# Patient Record
Sex: FEMALE | Race: WHITE | NOT HISPANIC OR LATINO | URBAN - METROPOLITAN AREA
[De-identification: names, ages, dates, MRNs, and addresses within clinical notes are randomized per-mention and may not be internally consistent; named-entity substitution may affect disease eponyms.]

---

## 2023-06-30 ENCOUNTER — CLINICAL SUPPORT (OUTPATIENT)
Dept: REHABILITATION | Facility: HOSPITAL | Age: 53
End: 2023-06-30
Payer: COMMERCIAL

## 2023-06-30 DIAGNOSIS — R27.8 COORDINATION ABNORMAL: Primary | ICD-10-CM

## 2023-06-30 DIAGNOSIS — R53.1 WEAKNESS: ICD-10-CM

## 2023-06-30 PROCEDURE — 97530 THERAPEUTIC ACTIVITIES: CPT | Mod: PN | Performed by: PHYSICAL THERAPIST

## 2023-06-30 PROCEDURE — 97162 PT EVAL MOD COMPLEX 30 MIN: CPT | Mod: PN | Performed by: PHYSICAL THERAPIST

## 2023-06-30 NOTE — PLAN OF CARE
"[facility]   Physical Therapy Initial Evaluation     Date: 6/30/2023   Name: Sherry Neumann  Clinic Number: [id number]    Therapy Diagnosis:   Encounter Diagnoses   Name Primary?    Coordination abnormal Yes    Weakness      Physician: Praful Vazquez MD    Physician Orders: PT Eval and Treat PF PT  Medical Diagnosis from Referral: R32 (ICD-10-CM) - Urinary incontinence   Evaluation Date: 6/30/2023  Authorization Period Expiration: 7/30/23  Plan of Care Expiration: 9/22/2023  Progress Note Due: 7/28/2023  Visit # 1/12 Visits authorized: 1/ 1   FOTO: 1/3    Precautions: Standard     Time In: 11:00 AM   Time Out: 11:50 AM   Total Appointment Time (timed & untimed codes): 50 minutes      HISTORY      Sherry is a 53 y.o. female evaluated on 06/30/2023    Physician:  Praful Vazquez MD   Diagnosis:   Encounter Diagnoses   Name Primary?    Coordination abnormal Yes    Weakness       Treatment ordered: Physical Therapy  Medical History: Unremarkable  Surgical History:   Gallbladder surgery, knee surgery  Medications:   On Progesterone, but does not take it as she should.       Allergies: none    Precautions: universal    OB/GYN History:  She has 7 children. Vaginal deliveries. Episiotomy and stiches for her first. Her youngest is 14, oldest is 32. She did breastfeed all of her children.     Bladder/Bowel History: trouble initiating urine stream, childhood bladder problems - frequent UTI, dribbling after urination, trouble emptying bladder completely, recurrent bladder infections, and urinary incontinence      SUBJECTIVE     Date of onset: 2003  History of current complaint: Still having regular periods. Having heavier bleeding and as she is getting older her symptoms are more pronounced. She "feels" more. Will also have symptoms a few days before her period. She started the progesterone about 5 years ago with a cream. Patient states that about 20 years ago she started having problems with jumping on " a trampoline. It progressed to leaking when she sneezes. Wears a panty liner. Does not happen all of the time. Does like to play backyard volley ball. She is a teacher. Feels like she she would have to change her clothes. States that it will occur 4xs one day and other days not at all.     Patient's goals for therapy: improve bladder control   Pain: Patient reports 0/10, with 0 being the lowest and 10 being the highest.    Activities that cause symptoms: full bladder, with cough/sneeze/laugh/yell, and vigorous activity or exercise    Previous treatment included none    Sexually active? Yes - denies pain with intercourse    Frequency of urination:   Daytime: 8xs            Nighttime: 1-2 (coffee at 9 o'clock)    Difficulty initiating urine stream: No  Urine stream:  varies   Complete emptying: Yes, will return to the commode  Bladder leakage: Yes  Frequency of incidents: 1-4 times a day, sometimes none  Amount leaked (urine): teaspoons    Frequency of bowel movements: once a day, this has improved - vacations are hard, more urgent just before period  Difficulty initiating BM: Yes  Quality/Shape of BM: Casey Stool Chart 4  Colon leakage: No  Frequency of incidents: none   Amount leaked (bowels):  none  Form of protection: panty liner  Number of pads required in 24 hours: 1    Types of fluid intake: coffee (4 cups of coffee a day), water (constantly drinks - probably not enough)  Diet: Whole 30 diet - no dairy, no gluten, no added sugar, no grains  Current exercise: she does jog and does yoga, plays pickle ball, plays volleyball    Occupation: Pt works as a teacher and job-related duties include sitting, standing walking. She has more breaks in her day compared to when she had her own class.    OBJECTIVE     ORTHO SCREEN  Posture: WNL  Pelvic alignment:  not assessed     ABDOMINALS - not assessed     VAGINAL PELVIC FLOOR EXAM - to be performed      TREATMENT      Sherry participated in dynamic functional therapeutic  activities to improve functional performance for 15  minutes, including:  Physical therapist educated pt on core       Education: instructed on general anatomy/physiology of urinary/bowel system; discussed plan of care with patient and parent/guardian; instructed in benefits/risks of treatment; instructed in alternative methods of treatment; instructed in risks of refusing treatment; patient a parent agreed to treatment plan.     Also educated in: bladder irritants and anatomy/physiology of pelvic floor    ASSESSMENT      This is a 53 y.o. female referred to outpatient physical therapy and presents with a medical diagnosis of R32 (ICD-10-CM) - Urinary incontinence . Patient will benefit from skilled physical therapy to improve core and pelvic floor coordination and awareness, improve bowel and bladder habits.    Educational/Spiritual/Cultural needs: none  Abuse/Neglect: no signs  Nutritional Status: WDWN   Fall Risk: pt is not a fall risk    Pt's spiritual, cultural and educational needs considered and pt agreeable to plan of care and goals as stated below:     Medical necessity is demonstrated by the following IMPAIRMENTS/PROMBLEMS     History  Co-morbidities and personal factors that may impact the plan of care Examination  Body Structures and Functions, activity limitations and participation restrictions that may impact the plan of care Clinical Presentation   Decision Making/ Complexity Score   Co-morbidities:                 Personal Factors:    Body Regions/Systems/Functions:    poor knowledge of body mechanics and posture, decreased pelvic muscle strength, increased tension of the pelvic muscles, increased nocturia, poor fluid intake, poor knowledge of vulvar irritants, incomplete urination, dysfunctional voiding, and dysfunctional defecation           Activity limitations:   Barriers to Learning: none  Environmental Barriers: none noted    Participation Restrictions:           moderate     PLAN    Frequency:  1x per week  Duration: 12 weeks    Short Term Goals: 6 weeks   Patient will be independent with pelvic floor down training.  Patient will be independent with pelvic floor relaxation to improve bowel and bladder evacuation without straining.  Patient will be able to demonstrate improved pelvic floor relaxation and contraction on rehabilitative ultrasound vs. sEMG.  Patient will report regular meditation, diaphragmatic breathing, pelvic floor down training.   Patient will report being dry 5/7 days a week.   Patient will be independent with good water intake.     Long Term Goals: 12 weeks   Patient will report urinating every 3-4 hours with strong urine stream.   Patient will report improved quality of life and tolerance for activities outside of her home due to improved bladder habits.  Patient will demonstrate adequate pelvic floor coordination with contraction and relaxation on sEMG vs rehabilitative ultrasound.    Patient will be independent with proper core and pelvic floor coordination with progressive strength training.      Physical therapy will include: therapeutic exercise, manual therapy, therapeutic activities, neuromuscular re-education, manual therapy, modalities PRN, gait training, and self-care education.     Therapist: Gui Colorado, PT  6/30/2023

## 2023-07-27 ENCOUNTER — CLINICAL SUPPORT (OUTPATIENT)
Dept: REHABILITATION | Facility: HOSPITAL | Age: 53
End: 2023-07-27
Attending: OBSTETRICS & GYNECOLOGY
Payer: COMMERCIAL

## 2023-07-27 DIAGNOSIS — R53.1 WEAKNESS: ICD-10-CM

## 2023-07-27 DIAGNOSIS — R27.8 COORDINATION ABNORMAL: Primary | ICD-10-CM

## 2023-07-27 DIAGNOSIS — R32 URINARY INCONTINENCE, UNSPECIFIED TYPE: ICD-10-CM

## 2023-07-27 PROCEDURE — 97112 NEUROMUSCULAR REEDUCATION: CPT | Mod: PN | Performed by: PHYSICAL THERAPIST

## 2023-07-27 PROCEDURE — 97530 THERAPEUTIC ACTIVITIES: CPT | Mod: PN | Performed by: PHYSICAL THERAPIST

## 2023-07-27 PROCEDURE — 97140 MANUAL THERAPY 1/> REGIONS: CPT | Mod: PN | Performed by: PHYSICAL THERAPIST

## 2023-07-27 NOTE — PATIENT INSTRUCTIONS
DIAPHRAGMATIC BREATHING     The diaphragm is a dome shaped muscle that forms the floor of the rib cage. It is the most efficient muscle for breathing and relaxation, although most people are not used to using the diaphragm. Diaphragmatic or belly breathing is an important technique to learn because it helps settle down or relax the autonomic nervous system. The correct use of diaphragmatic breathing can help to quiet brain activity resulting in the relaxation of all the muscles and organs of the body. This is accomplished by slow rhythmic breathing concentrated in the diaphragm muscle rather than the chest.    How to do proper relaxation breathing:    Start by lying on your back or reclining in a chair in a relaxed position. Place one hand on your chest and the other on your abdomen.  Relax your jaw by placing your tongue on the floor of your mouth and keeping your teeth slightly apart.   Take a deep breath in, letting the abdomen expand and rise while you keep your upper chest, neck and shoulders relaxed.   As you breathe out, allow your abdomen and chest to fall. Exhale completely.  It doesn't matter if you breathe in/out through your nose and/or mouth. Do whichever feels comfortable.  Remember to breathe slowly.  Do not force your breathing. Do not hold your breath.  Repeat for 5 minutes every day.         Pelvic floor relaxation: gentle feeling of down an out   - occurs when urination, bowel movement, vaginal penetration, or passing gas  - every hour - tune into your pelvic floor and relax   - do not hold tension in the hips  - with breathing - let the diaphragm descend and the pelvic floor descend     A tight muscle does not work very well - we want coordination - we want to be able to contract the pelvic floor when we need it - not just all day for no reason     Pelvic floor contraction: Maryann - for now - do not practice this over and over use it if you need it!   Gentle feeling of up and in   Close your  box  Do not hold your breath   Inhale, exhale, gently bring your belly button to your back, then pelvic floor up and in   Hold for 10 seconds  NO one should know what you are doing

## 2023-07-27 NOTE — PROGRESS NOTES
Pelvic Health Physical Therapy   Treatment Note     Name: Sherry Neumann  Clinic Number: [id number]    Therapy Diagnosis:   Encounter Diagnoses   Name Primary?    Coordination abnormal Yes    Weakness     Urinary incontinence, unspecified type      Physician: Praful Vazquez MD    Visit Date: 7/27/2023    Physician Orders: PT Eval and Treat PF PT  Medical Diagnosis from Referral: R32 (ICD-10-CM) - Urinary incontinence   Evaluation Date: 6/30/2023  Authorization Period Expiration: 7/30/23  Plan of Care Expiration: 9/22/2023  Progress Note Due: 7/28/2023  Visit # 2/12 Visits authorized: 1/20  FOTO: 1/3    Cancelled Visits: 0  No Show Visits: 0    Time In: 1:12 PM   Time Out: 2:20 PM   Total Billable Time: 68 minutes    Precautions: Standard    Subjective     Pt reports: That she will leak urine with jumping/coughing/sneezing at times. This is worse just before her period. She admits to needing to take more time for Yoga.     She was compliant with home exercise program.  Response to previous treatment: none  Functional change: none    Pain in:  0/10  Pain out: 0/10  Location: none    Objective     Sherry received the following manual therapy techniques: for 10 minutes including: vaginal exam  VAGINAL PELVIC FLOOR EXAM    EXTERNAL ASSESSMENT  Introitus: WNL  Skin condition: WNL  Scarring: none   Sensation: WNL   Pain:  none  Voluntary contraction: nil  Voluntary relaxation: nil  Involuntary contraction: nil  Bearing down: nil  Perineal descent: absent      INTERNAL ASSESSMENT  Pain: none   Sensation: WNL  Vaginal vault: stenotic   Muscle Bulk: hypertonus   Muscle Power: trace/5  Muscle Endurance: DNT   # Reps To Fatigue: DNT    Fast Contractions: DNT     Quality of contraction: incomplete relaxation   Specificity: WNL   Coordination: tends to hold breath during PFM contration   Prolapse check:redundant tissue noted with increased intra-abdominal pressure at the anterior vaginal wall - mild  Comments: patient with limited  pelvic floor awareness    Sherry participated in neuromuscular re-education activities to develop Coordination, Down training, and Proprioception for 30 minutes including: rehabilitative ultrasound imaging to help visualize pelvic floor muscle contraction and relaxation with kegels  Educated on management of intra-abdominal pressure.     Sherry participated in dynamic functional therapeutic activities to improve functional performance for 23  minutes, including:  Encouraged diaphragmatic breathing, improved posture, regular stretching and cool down.           Intervention Eval  07/27/2023           Neuro Re-Ed PF down training   rehabilitative ultrasound imaging to help visualize pelvic floor muscle contraction and relaxation with kegels  Educated on management of intra-abdominal pressure.      Manual Ther Vaginal exam   hypertonicity    TherAct     Encouraged diaphragmatic breathing, improved posture, regular stretching and cool down.          Home Exercises Provided and Patient Education Provided     Education provided:   - anatomy/physiology of pelvic floor, diaphragmatic breathing, and proper bearing down techniques  Discussed progression of plan of care with patient; educated pt in activity modification; reviewed HEP with pt. Pt demonstrated and verbalized understanding of all instruction and was provided with a handout of HEP (see Patient Instructions).    Written Home Exercises Provided: yes.  Exercises were reviewed and Sherry was able to demonstrate them prior to the end of the session.  Sherry demonstrated good  understanding of the education provided.     See EMR under Patient Instructions for exercises provided 07/27/2023 .    Assessment     Patient with limited PF mobility and awareness. PF tension is limiting her ability to contract the pelvic floor when needed.     Sherry Is progressing well towards her goals.   Pt prognosis is Excellent.     Pt will continue to benefit from skilled outpatient physical therapy  to address the deficits listed in the problem list box on initial evaluation, provide pt/family education and to maximize pt's level of independence in the home and community environment.     Pt's spiritual, cultural and educational needs considered and pt agreeable to plan of care and goals.     Anticipated barriers to physical therapy: none    Short Term Goals: 6 weeks   Patient will be independent with pelvic floor down training.  Patient will be independent with pelvic floor relaxation to improve bowel and bladder evacuation without straining.  Patient will be able to demonstrate improved pelvic floor relaxation and contraction on rehabilitative ultrasound vs. sEMG.  Patient will report regular meditation, diaphragmatic breathing, pelvic floor down training.   Patient will report being dry 5/7 days a week.   Patient will be independent with good water intake.      Long Term Goals: 12 weeks   Patient will report urinating every 3-4 hours with strong urine stream.   Patient will report improved quality of life and tolerance for activities outside of her home due to improved bladder habits.  Patient will demonstrate adequate pelvic floor coordination with contraction and relaxation on sEMG vs rehabilitative ultrasound.    Patient will be independent with proper core and pelvic floor coordination with progressive strength training.      Plan     Continue with PF down training, proper core stability to manage intra-abdominal pressure, PF manual therapy.     Gui Colorado, PT

## 2023-08-08 ENCOUNTER — CLINICAL SUPPORT (OUTPATIENT)
Dept: REHABILITATION | Facility: HOSPITAL | Age: 53
End: 2023-08-08
Attending: OBSTETRICS & GYNECOLOGY
Payer: COMMERCIAL

## 2023-08-08 DIAGNOSIS — R27.8 COORDINATION ABNORMAL: Primary | ICD-10-CM

## 2023-08-08 DIAGNOSIS — R53.1 WEAKNESS: ICD-10-CM

## 2023-08-08 PROCEDURE — 97112 NEUROMUSCULAR REEDUCATION: CPT | Mod: PN | Performed by: PHYSICAL THERAPIST

## 2023-08-08 PROCEDURE — 97530 THERAPEUTIC ACTIVITIES: CPT | Mod: PN | Performed by: PHYSICAL THERAPIST

## 2023-08-08 NOTE — PROGRESS NOTES
Pelvic Health Physical Therapy   Treatment Note     Name: Sherry Neumann  Clinic Number: [id number]    Therapy Diagnosis:   Encounter Diagnoses   Name Primary?    Coordination abnormal Yes    Weakness      Physician: No ref. provider found    Visit Date: 8/8/2023    Physician Orders: PT Eval and Treat PF PT  Medical Diagnosis from Referral: R32 (ICD-10-CM) - Urinary incontinence   Evaluation Date: 6/30/2023  Authorization Period Expiration: 7/30/23  Plan of Care Expiration: 9/22/2023  Progress Note Due: 7/28/2023  Visit # 2/12 Visits authorized: 1/20  FOTO: 1/3    Cancelled Visits: 0  No Show Visits: 0    Time In: 3:21 PM   Time Out: 4:04 PM    Total Billable Time: 43 minutes    Precautions: Standard    Subjective     Pt reports: The last few times she sneezed she was able to hold it. She has done yoga recently.     She was compliant with home exercise program.  Response to previous treatment: none  Functional change: none    Pain in:  0/10  Pain out: 0/10  Location: none    Objective     Sherry participated in neuromuscular re-education activities to develop Coordination, Down training, and Proprioception for 15 minutes including: rehabilitative ultrasound imaging to help visualize pelvic floor muscle contraction and relaxation with kegels  Educated on management of intra-abdominal pressure. Rehabilitative ultrasound to the core with emphlysis on TA activation.     Sherry participated in dynamic functional therapeutic activities to improve functional performance for 28  minutes, including:  Encouraged core awareness and management of intra-abdominal pressure with dynamic tasks. Encouraged plyometrics with exercises to assist with core stability and awareness.          Intervention Eval  07/27/2023 08/08/2023           Neuro Re-Ed PF down training   rehabilitative ultrasound imaging to help visualize pelvic floor muscle contraction and relaxation with kegels  Educated on management of intra-abdominal pressure.    rehabilitative ultrasound imaging to help visualize pelvic floor muscle contraction and relaxation with kegels  Educated on management of intra-abdominal pressure. Rehabilitative ultrasound to the core with emphlysis on TA activation.    Manual Ther Vaginal exam   hypertonicity    TherAct     Encouraged diaphragmatic breathing, improved posture, regular stretching and cool down.   Encouraged core awareness and management of intra-abdominal pressure with dynamic tasks. Encouraged plyometrics with exercises to assist with core stability and awareness.          Home Exercises Provided and Patient Education Provided     Education provided:   - anatomy/physiology of pelvic floor, diaphragmatic breathing, and proper bearing down techniques  Discussed progression of plan of care with patient; educated pt in activity modification; reviewed HEP with pt. Pt demonstrated and verbalized understanding of all instruction and was provided with a handout of HEP (see Patient Instructions).    Written Home Exercises Provided: yes.  Exercises were reviewed and Sherry was able to demonstrate them prior to the end of the session.  Sherry demonstrated good  understanding of the education provided.     See EMR under Patient Instructions for exercises provided 07/27/2023 .    Assessment     Patient with improved core awareness today. Encouraged use of her hands to feel her TA contract and feel pressure.     Sherry Is progressing well towards her goals.   Pt prognosis is Excellent.     Pt will continue to benefit from skilled outpatient physical therapy to address the deficits listed in the problem list box on initial evaluation, provide pt/family education and to maximize pt's level of independence in the home and community environment.     Pt's spiritual, cultural and educational needs considered and pt agreeable to plan of care and goals.     Anticipated barriers to physical therapy: none    Short Term Goals: 6 weeks   Patient will be  independent with pelvic floor down training.  Patient will be independent with pelvic floor relaxation to improve bowel and bladder evacuation without straining.  Patient will be able to demonstrate improved pelvic floor relaxation and contraction on rehabilitative ultrasound vs. sEMG.  Patient will report regular meditation, diaphragmatic breathing, pelvic floor down training.   Patient will report being dry 5/7 days a week.   Patient will be independent with good water intake.      Long Term Goals: 12 weeks   Patient will report urinating every 3-4 hours with strong urine stream.   Patient will report improved quality of life and tolerance for activities outside of her home due to improved bladder habits.  Patient will demonstrate adequate pelvic floor coordination with contraction and relaxation on sEMG vs rehabilitative ultrasound.    Patient will be independent with proper core and pelvic floor coordination with progressive strength training.      Plan     Continue with PF down training, proper core stability to manage intra-abdominal pressure, PF manual therapy.     Gui [place], PT

## 2023-08-24 ENCOUNTER — CLINICAL SUPPORT (OUTPATIENT)
Dept: REHABILITATION | Facility: HOSPITAL | Age: 53
End: 2023-08-24
Attending: OBSTETRICS & GYNECOLOGY
Payer: COMMERCIAL

## 2023-08-24 DIAGNOSIS — R32 URINARY INCONTINENCE, UNSPECIFIED TYPE: ICD-10-CM

## 2023-08-24 DIAGNOSIS — R27.8 COORDINATION ABNORMAL: Primary | ICD-10-CM

## 2023-08-24 DIAGNOSIS — R53.1 WEAKNESS: ICD-10-CM

## 2023-08-24 PROCEDURE — 97112 NEUROMUSCULAR REEDUCATION: CPT | Mod: PN | Performed by: PHYSICAL THERAPIST

## 2023-08-24 NOTE — PATIENT INSTRUCTIONS
Pelvic floor relaxation: gentle feeling of down and out - let go of the tension  - butterfly, happy baby pose, maricruz pose  - occurs when urination, bowel movement, vaginal penetration, or passing gas  - every hour - tune into your pelvic floor and relax   - do not hold tension in the hips - crossing the legs   - with breathing - let the diaphragm descend and the pelvic floor descend      A tight muscle does not work very well - good cool down after a workout.       Pelvic floor contraction:   Gentle feeling of up and in   Close your box  Do not hold your breath   Inhale, exhale, gently bring your belly button to your back, then pelvic floor up and in   Hold for 10 seconds if can   NO one should know what you are doing    With cough, sneeze, laugh, lift - close your box quickly

## 2023-08-24 NOTE — PROGRESS NOTES
Pelvic Health Physical Therapy   Treatment Note     Name: Sherry Neumann  Clinic Number: [id number]    Therapy Diagnosis:   Encounter Diagnoses   Name Primary?    Coordination abnormal Yes    Weakness     Urinary incontinence, unspecified type      Physician: Praful Vazquez MD    Visit Date: 8/24/2023    Physician Orders: PT Eval and Treat PF PT  Medical Diagnosis from Referral: R32 (ICD-10-CM) - Urinary incontinence   Evaluation Date: 6/30/2023  Authorization Period Expiration: 7/30/23  Plan of Care Expiration: 9/22/2023  Progress Note Due: 7/28/2023  Visit # 4/12 Visits authorized: 3/20  FOTO: 1/3    Cancelled Visits: 0  No Show Visits: 0    Time In: 3:28 PM   Time Out: 4:01 PM    Total Billable Time: 33 minutes    Precautions: Standard    Subjective     Pt reports: Doing better with UI. Has not been wearing a pad. Doing Yoga 2xs a week.       She was compliant with home exercise program.  Response to previous treatment: none  Functional change: none    Pain in:  0/10  Pain out: 0/10  Location: none    Objective     Sherry participated in neuromuscular re-education activities to develop Coordination, Down training, and Proprioception for 33 minutes including: rehabilitative ultrasound imaging to help visualize pelvic floor muscle contraction and relaxation with kegels  Rehabilitative ultrasound with PF coordination to assist with UI. Limited pelvic floor activation today.          Intervention Eval  07/27/2023 08/08/2023 08/24/2023            Neuro Re-Ed PF down training   rehabilitative ultrasound imaging to help visualize pelvic floor muscle contraction and relaxation with kegels  Educated on management of intra-abdominal pressure.   rehabilitative ultrasound imaging to help visualize pelvic floor muscle contraction and relaxation with kegels  Educated on management of intra-abdominal pressure. Rehabilitative ultrasound to the core with emphlysis on TA activation.  363mL     PF coordination - limited PF activation  - partially due to very full bladder   PVR: 0mL   Manual Ther Vaginal exam   hypertonicity     TherAct     Encouraged diaphragmatic breathing, improved posture, regular stretching and cool down.   Encouraged core awareness and management of intra-abdominal pressure with dynamic tasks. Encouraged plyometrics with exercises to assist with core stability and awareness.           Home Exercises Provided and Patient Education Provided     Education provided:   - anatomy/physiology of pelvic floor, diaphragmatic breathing, and proper bearing down techniques  Discussed progression of plan of care with patient; educated pt in activity modification; reviewed HEP with pt. Pt demonstrated and verbalized understanding of all instruction and was provided with a handout of HEP (see Patient Instructions).    Written Home Exercises Provided: yes.  Exercises were reviewed and Sherry was able to demonstrate them prior to the end of the session.  Sherry demonstrated good  understanding of the education provided.     See EMR under Patient Instructions for exercises provided 07/27/2023 .    Assessment     Limited PF activation today; however, bladder was very full. She did empty well. Will attempt to come in next week without significant bladder fullness.     Sherry Is progressing well towards her goals.   Pt prognosis is Excellent.     Pt will continue to benefit from skilled outpatient physical therapy to address the deficits listed in the problem list box on initial evaluation, provide pt/family education and to maximize pt's level of independence in the home and community environment.     Pt's spiritual, cultural and educational needs considered and pt agreeable to plan of care and goals.     Anticipated barriers to physical therapy: none    Short Term Goals: 6 weeks   Patient will be independent with pelvic floor down training.  Patient will be independent with pelvic floor relaxation to improve bowel and bladder evacuation without  straining.  Patient will be able to demonstrate improved pelvic floor relaxation and contraction on rehabilitative ultrasound vs. sEMG.  Patient will report regular meditation, diaphragmatic breathing, pelvic floor down training.   Patient will report being dry 5/7 days a week.   Patient will be independent with good water intake.      Long Term Goals: 12 weeks   Patient will report urinating every 3-4 hours with strong urine stream.   Patient will report improved quality of life and tolerance for activities outside of her home due to improved bladder habits.  Patient will demonstrate adequate pelvic floor coordination with contraction and relaxation on sEMG vs rehabilitative ultrasound.    Patient will be independent with proper core and pelvic floor coordination with progressive strength training.      Plan     Continue with PF down training, proper core stability to manage intra-abdominal pressure, PF manual therapy.     Gui [place], PT

## 2023-08-31 ENCOUNTER — CLINICAL SUPPORT (OUTPATIENT)
Dept: REHABILITATION | Facility: HOSPITAL | Age: 53
End: 2023-08-31
Attending: OBSTETRICS & GYNECOLOGY
Payer: COMMERCIAL

## 2023-08-31 DIAGNOSIS — R32 URINARY INCONTINENCE, UNSPECIFIED TYPE: ICD-10-CM

## 2023-08-31 DIAGNOSIS — R27.8 COORDINATION ABNORMAL: Primary | ICD-10-CM

## 2023-08-31 DIAGNOSIS — R53.1 WEAKNESS: ICD-10-CM

## 2023-08-31 PROCEDURE — 97112 NEUROMUSCULAR REEDUCATION: CPT | Mod: PN | Performed by: PHYSICAL THERAPIST

## 2023-08-31 NOTE — PROGRESS NOTES
Pelvic Health Physical Therapy   Treatment Note and Progress note     Name: Sherry Neumann  Clinic Number: [id number]    Therapy Diagnosis:   Encounter Diagnoses   Name Primary?    Coordination abnormal Yes    Weakness     Urinary incontinence, unspecified type      Physician: Praful Vazquez MD    Visit Date: 8/31/2023    Physician Orders: PT Eval and Treat PF PT  Medical Diagnosis from Referral: R32 (ICD-10-CM) - Urinary incontinence   Evaluation Date: 6/30/2023  Authorization Period Expiration: 7/30/23  Plan of Care Expiration: 9/22/2023  Progress Note Due:  9/28/2023  Visit # 5/12 Visits authorized: 4/20  FOTO: 1/3    Cancelled Visits: 0  No Show Visits: 0    Time In: 3:28 PM   Time Out: 4:09 PM   Total Billable Time: 41 minutes    Precautions: Standard    Subjective     Pt reports: Has been very busy. Has been forgetting to perform Kegel pirior to sneezing. Has had a few bouts of UI. Doing Yoga.     She was compliant with home exercise program.  Response to previous treatment: none  Functional change: none    Pain in:  0/10  Pain out: 0/10  Location: none    Objective     Sherry participated in neuromuscular re-education activities to develop Coordination, Down training, and Proprioception for 41 minutes including: rehabilitative ultrasound imaging to help visualize pelvic floor muscle contraction and relaxation with kegels  Rehabilitative ultrasound with PF coordination to assist with UI. Limited pelvic floor activation today.Added lower extremity adduction with Kegels to better facilitate PF contraction. Discussed the benefits of hormones for vaginal tissue health.           Intervention Eval  07/27/2023 08/08/2023 08/24/2023 08/31/2023             Neuro Re-Ed PF down training   rehabilitative ultrasound imaging to help visualize pelvic floor muscle contraction and relaxation with kegels  Educated on management of intra-abdominal pressure.   rehabilitative ultrasound imaging to help visualize pelvic floor  muscle contraction and relaxation with kegels  Educated on management of intra-abdominal pressure. Rehabilitative ultrasound to the core with emphlysis on TA activation.  363mL     PF coordination - limited PF activation - partially due to very full bladder   PVR: 0mL Endurnance = 2 sec  Uncoordinated quick Kegels   Benefiting from overflow of bilateral adductors   Manual Ther Vaginal exam   hypertonicity      TherAct     Encouraged diaphragmatic breathing, improved posture, regular stretching and cool down.   Encouraged core awareness and management of intra-abdominal pressure with dynamic tasks. Encouraged plyometrics with exercises to assist with core stability and awareness.            Home Exercises Provided and Patient Education Provided     Education provided:   - anatomy/physiology of pelvic floor, diaphragmatic breathing, and proper bearing down techniques  Discussed progression of plan of care with patient; educated pt in activity modification; reviewed HEP with pt. Pt demonstrated and verbalized understanding of all instruction and was provided with a handout of HEP (see Patient Instructions).    Written Home Exercises Provided: yes.  Exercises were reviewed and Sherry was able to demonstrate them prior to the end of the session.  Sherry demonstrated good  understanding of the education provided.     See EMR under Patient Instructions for exercises provided 07/27/2023 .    Assessment     Improved PF activation with lower extremity adduction and in hooklying today.     Sherry Is progressing well towards her goals.   Pt prognosis is Excellent.     Pt will continue to benefit from skilled outpatient physical therapy to address the deficits listed in the problem list box on initial evaluation, provide pt/family education and to maximize pt's level of independence in the home and community environment.     Pt's spiritual, cultural and educational needs considered and pt agreeable to plan of care and goals.      Anticipated barriers to physical therapy: none    Short Term Goals: 6 weeks   Patient will be independent with pelvic floor down training. Goal met 08/31/2023   Patient will be independent with pelvic floor relaxation to improve bowel and bladder evacuation without straining. Goal met 08/31/2023   Patient will be able to demonstrate improved pelvic floor relaxation and contraction on rehabilitative ultrasound vs. sEMG.Goal met 08/31/2023   Patient will report regular meditation, diaphragmatic breathing, pelvic floor down training. Goal met 08/31/2023   Patient will report being dry 5/7 days a week. Goal not met - progressing   Patient will be independent with good water intake. Goal met 08/31/2023      Long Term Goals: 12 weeks   Patient will report urinating every 3-4 hours with strong urine stream. Goal not met progressing  Patient will report improved quality of life and tolerance for activities outside of her home due to improved bladder habits.Goal not met progressing  Patient will demonstrate adequate pelvic floor coordination with contraction and relaxation on sEMG vs rehabilitative ultrasound.  Goal not met progressing  Patient will be independent with proper core and pelvic floor coordination with progressive strength training.  Goal not met progressing    Plan     Continue with PF down training, proper core stability to manage intra-abdominal pressure, PF manual therapy.     Gui [place], PT

## 2023-09-19 ENCOUNTER — CLINICAL SUPPORT (OUTPATIENT)
Dept: REHABILITATION | Facility: HOSPITAL | Age: 53
End: 2023-09-19
Attending: OBSTETRICS & GYNECOLOGY
Payer: COMMERCIAL

## 2023-09-19 DIAGNOSIS — R53.1 WEAKNESS: ICD-10-CM

## 2023-09-19 DIAGNOSIS — R27.8 COORDINATION ABNORMAL: Primary | ICD-10-CM

## 2023-09-19 PROCEDURE — 97112 NEUROMUSCULAR REEDUCATION: CPT | Mod: PN | Performed by: PHYSICAL THERAPIST

## 2023-09-19 NOTE — PROGRESS NOTES
Pelvic Health Physical Therapy   Treatment Note and Updated plan of care      Name: Sherry Neumann  Clinic Number: [id number]    Therapy Diagnosis:   Encounter Diagnoses   Name Primary?    Coordination abnormal Yes    Weakness      Physician: Praful Vazquez MD    Visit Date: 9/19/2023    Physician Orders: PT Eval and Treat PF PT  Medical Diagnosis from Referral: R32 (ICD-10-CM) - Urinary incontinence   Evaluation Date: 6/30/2023  Authorization Period Expiration: 7/30/23  Plan of Care Expiration: 9/22/2023  Progress Note Due:  9/28/2023  Visit # 6/12 Visits authorized: 5/20  FOTO: 1/3    Cancelled Visits: 0  No Show Visits: 0    Time In: 3:27 PM   Time Out: 4:08 PM    Total Billable Time: 41 minutes    Precautions: Standard    Subjective     Pt reports: Needs to get back on track with her Kegels. Still forgetting to Kegel prior to sneezing. Doing more Kegels in the car. Takes Progesterone by mouth and she does not take. Having break through bleeding.     She was compliant with home exercise program.  Response to previous treatment: none  Functional change: none    Pain in:  0/10  Pain out: 0/10  Location: none    Objective     Sherry participated in neuromuscular re-education activities to develop Coordination, Down training, and Proprioception for 41 minutes including: rehabilitative ultrasound imaging to help visualize pelvic floor muscle contraction and relaxation with kegels  Rehabilitative ultrasound with PF coordination to assist with UI. Limited pelvic floor activation today.Added lower extremity adduction with Kegels to better facilitate PF contraction. Discussed the benefits of hormones for vaginal tissue health.  Encouraged adequate core stability to improve bladder control as well.          Intervention Eval  07/27/2023 08/08/2023 08/24/2023 08/31/2023 09/19/2023              Neuro Re-Ed PF down training   rehabilitative ultrasound imaging to help visualize pelvic floor muscle contraction and relaxation  with kegels  Educated on management of intra-abdominal pressure.   rehabilitative ultrasound imaging to help visualize pelvic floor muscle contraction and relaxation with kegels  Educated on management of intra-abdominal pressure. Rehabilitative ultrasound to the core with emphlysis on TA activation.  363mL     PF coordination - limited PF activation - partially due to very full bladder   PVR: 0mL Endurnance = 2 sec  Uncoordinated quick Kegels   Benefiting from overflow of bilateral adductors rehabilitative ultrasound imaging to help visualize pelvic floor muscle contraction and relaxation with kegels  Rehabilitative ultrasound with PF coordination to assist with UI. Limited pelvic floor activation today.Added lower extremity adduction with Kegels to better facilitate PF contraction. Discussed the benefits of hormones for vaginal tissue health.  Encouraged adequate core stability to improve bladder control as well.    Manual Ther Vaginal exam   hypertonicity       TherAct     Encouraged diaphragmatic breathing, improved posture, regular stretching and cool down.   Encouraged core awareness and management of intra-abdominal pressure with dynamic tasks. Encouraged plyometrics with exercises to assist with core stability and awareness.             Home Exercises Provided and Patient Education Provided     Education provided:   - anatomy/physiology of pelvic floor, diaphragmatic breathing, and proper bearing down techniques  Discussed progression of plan of care with patient; educated pt in activity modification; reviewed HEP with pt. Pt demonstrated and verbalized understanding of all instruction and was provided with a handout of HEP (see Patient Instructions).    Written Home Exercises Provided: yes.  Exercises were reviewed and Sherry was able to demonstrate them prior to the end of the session.  Sherry demonstrated good  understanding of the education provided.     See EMR under Patient Instructions for exercises  provided 07/27/2023 .    Assessment     Improved PF activation with lower extremity adduction and in hooklying today.     Sherry Is progressing well towards her goals.   Pt prognosis is Excellent.     Pt will continue to benefit from skilled outpatient physical therapy to address the deficits listed in the problem list box on initial evaluation, provide pt/family education and to maximize pt's level of independence in the home and community environment.     Pt's spiritual, cultural and educational needs considered and pt agreeable to plan of care and goals.     Anticipated barriers to physical therapy: none    Short Term Goals: 6 weeks   Patient will be independent with pelvic floor down training. Goal met 09/19/2023   Patient will be independent with pelvic floor relaxation to improve bowel and bladder evacuation without straining. Goal met 09/19/2023   Patient will be able to demonstrate improved pelvic floor relaxation and contraction on rehabilitative ultrasound vs. sEMG.Goal met 08/31/2023   Patient will report regular meditation, diaphragmatic breathing, pelvic floor down training. Goal met 08/31/2023   Patient will report being dry 5/7 days a week. Goal not met - progressing   Patient will be independent with good water intake. Goal met 08/31/2023      Long Term Goals: 12 weeks   Patient will report urinating every 3-4 hours with strong urine stream. Goal not met progressing  Patient will report improved quality of life and tolerance for activities outside of her home due to improved bladder habits.Goal not met progressing  Patient will demonstrate adequate pelvic floor coordination with contraction and relaxation on sEMG vs rehabilitative ultrasound.  Goal not met progressing  Patient will be independent with proper core and pelvic floor coordination with progressive strength training.  Goal not met progressing    Plan     Continue with PF down training, proper core stability to manage intra-abdominal  pressure, PF manual therapy.     Progress treatment with vaginal e-stim for pelvic floor strength training.     Will continue with POC 1x a week x 7 weeks.patient has been advised to discuss vaginal estrogen with her gyn. She may also benefit from a urethral bulking agent in the future.     Gui [place], PT

## 2023-09-29 ENCOUNTER — CLINICAL SUPPORT (OUTPATIENT)
Dept: REHABILITATION | Facility: HOSPITAL | Age: 53
End: 2023-09-29
Payer: COMMERCIAL

## 2023-09-29 DIAGNOSIS — R53.1 WEAKNESS: ICD-10-CM

## 2023-09-29 DIAGNOSIS — R27.8 COORDINATION ABNORMAL: Primary | ICD-10-CM

## 2023-09-29 DIAGNOSIS — R32 URINARY INCONTINENCE, UNSPECIFIED TYPE: ICD-10-CM

## 2023-09-29 PROCEDURE — 97112 NEUROMUSCULAR REEDUCATION: CPT | Mod: PN | Performed by: PHYSICAL THERAPIST

## 2023-09-29 NOTE — PROGRESS NOTES
Pelvic Health Physical Therapy   Treatment Note      Name: Sherry Neumann  Clinic Number: [id number]    Therapy Diagnosis:   Encounter Diagnoses   Name Primary?    Coordination abnormal Yes    Urinary incontinence, unspecified type     Weakness        Physician: Praful Vazquez MD    Visit Date: 9/29/2023    Physician Orders: PT Eval and Treat PF PT  Medical Diagnosis from Referral: R32 (ICD-10-CM) - Urinary incontinence   Evaluation Date: 6/30/2023  Authorization Period Expiration: 7/30/23  Plan of Care Expiration: 11/7/23  Progress Note Due:  10/19/23  Visit # 7/12 Visits authorized: 6/20  FOTO: 1/3    Cancelled Visits: 0  No Show Visits: 0    Time In: 7:32 AM   Time Out: 8:00 AM    Total Billable Time: 28 minutes    Precautions: Standard    Subjective     Pt reports: She sneezed yesterday and did not leak. She was able to do Yoga 3xs this week and she was able to run.     She was compliant with home exercise program.  Response to previous treatment: none  Functional change: none    Pain in:  0/10  Pain out: 0/10  Location: none    Objective     Sherry participated in neuromuscular re-education activities to develop Coordination, Down training, and Proprioception for 28 minutes including: rehabilitative ultrasound imaging to help visualize pelvic floor muscle contraction and relaxation with kegels  sEMG with stim - pt would fatigue and have to increase pelvic floor recruitment with Kegels  Resting 0uV - Kegel to 10uV with a 3-5 sec hold.   PF e-stim at 50Hz x 3 min to faciliate PF contraction         Intervention Eval  07/27/2023 08/08/2023 08/24/2023 08/31/2023 09/19/2023 09/29/2023               Neuro Re-Ed PF down training   rehabilitative ultrasound imaging to help visualize pelvic floor muscle contraction and relaxation with kegels  Educated on management of intra-abdominal pressure.   rehabilitative ultrasound imaging to help visualize pelvic floor muscle contraction and relaxation with kegels  Educated on  management of intra-abdominal pressure. Rehabilitative ultrasound to the core with emphlysis on TA activation.  363mL     PF coordination - limited PF activation - partially due to very full bladder   PVR: 0mL Endurnance = 2 sec  Uncoordinated quick Kegels   Benefiting from overflow of bilateral adductors rehabilitative ultrasound imaging to help visualize pelvic floor muscle contraction and relaxation with kegels  Rehabilitative ultrasound with PF coordination to assist with UI. Limited pelvic floor activation today.Added lower extremity adduction with Kegels to better facilitate PF contraction. Discussed the benefits of hormones for vaginal tissue health.  Encouraged adequate core stability to improve bladder control as well.  sEMG and PF e-stim   Manual Ther Vaginal exam   hypertonicity        TherAct     Encouraged diaphragmatic breathing, improved posture, regular stretching and cool down.   Encouraged core awareness and management of intra-abdominal pressure with dynamic tasks. Encouraged plyometrics with exercises to assist with core stability and awareness.              Home Exercises Provided and Patient Education Provided     Education provided:   - anatomy/physiology of pelvic floor, diaphragmatic breathing, and proper bearing down techniques  Discussed progression of plan of care with patient; educated pt in activity modification; reviewed HEP with pt. Pt demonstrated and verbalized understanding of all instruction and was provided with a handout of HEP (see Patient Instructions).    Written Home Exercises Provided: yes.  Exercises were reviewed and Sherry was able to demonstrate them prior to the end of the session.  Sherry demonstrated good  understanding of the education provided.     See EMR under Patient Instructions for exercises provided 07/27/2023 .    Assessment     PF fatigue. PF awareness has improved.     Sherry Is progressing well towards her goals.   Pt prognosis is Excellent.     Pt will  continue to benefit from skilled outpatient physical therapy to address the deficits listed in the problem list box on initial evaluation, provide pt/family education and to maximize pt's level of independence in the home and community environment.     Pt's spiritual, cultural and educational needs considered and pt agreeable to plan of care and goals.     Anticipated barriers to physical therapy: none    Short Term Goals: 6 weeks   Patient will be independent with pelvic floor down training. Goal met 09/19/2023   Patient will be independent with pelvic floor relaxation to improve bowel and bladder evacuation without straining. Goal met 09/19/2023   Patient will be able to demonstrate improved pelvic floor relaxation and contraction on rehabilitative ultrasound vs. sEMG.Goal met 08/31/2023   Patient will report regular meditation, diaphragmatic breathing, pelvic floor down training. Goal met 08/31/2023   Patient will report being dry 5/7 days a week. Goal not met - progressing   Patient will be independent with good water intake. Goal met 08/31/2023      Long Term Goals: 12 weeks   Patient will report urinating every 3-4 hours with strong urine stream. Goal not met progressing  Patient will report improved quality of life and tolerance for activities outside of her home due to improved bladder habits.Goal not met progressing  Patient will demonstrate adequate pelvic floor coordination with contraction and relaxation on sEMG vs rehabilitative ultrasound.  Goal not met progressing  Patient will be independent with proper core and pelvic floor coordination with progressive strength training.  Goal not met progressing    Plan     Continue with PF down training, proper core stability to manage intra-abdominal pressure, PF manual therapy.     Progress treatment with vaginal e-stim for pelvic floor strength training.     Will continue with POC 1x a week x 7 weeks.patient has been advised to discuss vaginal estrogen with  her gyn. She may also benefit from a urethral bulking agent in the future.     Gui [place], PT

## 2023-10-06 ENCOUNTER — CLINICAL SUPPORT (OUTPATIENT)
Dept: REHABILITATION | Facility: HOSPITAL | Age: 53
End: 2023-10-06
Payer: COMMERCIAL

## 2023-10-06 DIAGNOSIS — R53.1 WEAKNESS: ICD-10-CM

## 2023-10-06 DIAGNOSIS — R32 URINARY INCONTINENCE, UNSPECIFIED TYPE: ICD-10-CM

## 2023-10-06 DIAGNOSIS — R27.8 COORDINATION ABNORMAL: Primary | ICD-10-CM

## 2023-10-06 PROCEDURE — 97112 NEUROMUSCULAR REEDUCATION: CPT | Mod: PN | Performed by: PHYSICAL THERAPIST

## 2023-10-06 NOTE — PROGRESS NOTES
Pelvic Health Physical Therapy   Treatment Note      Name: Sherry Neumann  Clinic Number: [id number]    Therapy Diagnosis:   Encounter Diagnoses   Name Primary?    Coordination abnormal Yes    Urinary incontinence, unspecified type     Weakness      Physician: Praful Vazquez MD    Visit Date: 10/6/2023    Physician Orders: PT Eval and Treat PF PT  Medical Diagnosis from Referral: R32 (ICD-10-CM) - Urinary incontinence   Evaluation Date: 6/30/2023  Authorization Period Expiration: 7/30/23  Plan of Care Expiration: 11/7/23  Progress Note Due:  10/19/23  Visit # 8/12 Visits authorized: 7/20  FOTO: 1/3    Cancelled Visits: 0  No Show Visits: 0    Time In: 7:32 AM   Time Out: 8:00 AM    Total Billable Time: 28 minutes    Precautions: Standard    Subjective     Pt reports: No leaks this week. Still doing Yoga.     She was compliant with home exercise program.  Response to previous treatment: none  Functional change: none    Pain in:  0/10  Pain out: 0/10  Location: none    Objective     Sherry participated in neuromuscular re-education activities to develop Coordination, Down training, and Proprioception for 28 minutes including: rehabilitative ultrasound imaging to help visualize pelvic floor muscle contraction and relaxation with kegels  sEMG with stim - pt would fatigue and have to increase pelvic floor recruitment with Kegels  Resting 0uV - Kegel to 10uV with a 3-5 sec hold.   PF e-stim at 50Hz x 10 min to faciliate PF contraction         Intervention 08/31/2023  09/19/2023  09/29/2023  10/06/2023            Neuro Re-Ed PF down training Endurnance = 2 sec  Uncoordinated quick Kegels   Benefiting from overflow of bilateral adductors rehabilitative ultrasound imaging to help visualize pelvic floor muscle contraction and relaxation with kegels  Rehabilitative ultrasound with PF coordination to assist with UI. Limited pelvic floor activation today.Added lower extremity adduction with Kegels to better facilitate PF  contraction. Discussed the benefits of hormones for vaginal tissue health.  Encouraged adequate core stability to improve bladder control as well.  sEMG and PF e-stim PF e-stim 50Hz 5sec on 10 sec off x 10 min    Manual Ther Vaginal exam       TherAct            Home Exercises Provided and Patient Education Provided     Education provided:   - anatomy/physiology of pelvic floor, diaphragmatic breathing, and proper bearing down techniques  Discussed progression of plan of care with patient; educated pt in activity modification; reviewed HEP with pt. Pt demonstrated and verbalized understanding of all instruction and was provided with a handout of HEP (see Patient Instructions).    Written Home Exercises Provided: yes.  Exercises were reviewed and Sherry was able to demonstrate them prior to the end of the session.  Sherry demonstrated good  understanding of the education provided.     See EMR under Patient Instructions for exercises provided 07/27/2023 .    Assessment     PF fatigue. PF awareness has improved.     Sherry Is progressing well towards her goals.   Pt prognosis is Excellent.     Pt will continue to benefit from skilled outpatient physical therapy to address the deficits listed in the problem list box on initial evaluation, provide pt/family education and to maximize pt's level of independence in the home and community environment.     Pt's spiritual, cultural and educational needs considered and pt agreeable to plan of care and goals.     Anticipated barriers to physical therapy: none    Short Term Goals: 6 weeks   Patient will be independent with pelvic floor down training. Goal met 09/19/2023   Patient will be independent with pelvic floor relaxation to improve bowel and bladder evacuation without straining. Goal met 09/19/2023   Patient will be able to demonstrate improved pelvic floor relaxation and contraction on rehabilitative ultrasound vs. sEMG.Goal met 08/31/2023   Patient will report regular  meditation, diaphragmatic breathing, pelvic floor down training. Goal met 08/31/2023   Patient will report being dry 5/7 days a week. Goal not met - progressing   Patient will be independent with good water intake. Goal met 08/31/2023      Long Term Goals: 12 weeks   Patient will report urinating every 3-4 hours with strong urine stream. Goal not met progressing  Patient will report improved quality of life and tolerance for activities outside of her home due to improved bladder habits.Goal not met progressing  Patient will demonstrate adequate pelvic floor coordination with contraction and relaxation on sEMG vs rehabilitative ultrasound.  Goal not met progressing  Patient will be independent with proper core and pelvic floor coordination with progressive strength training.  Goal not met progressing    Plan     Continue with PF down training, proper core stability to manage intra-abdominal pressure, PF manual therapy.     Progress treatment with vaginal e-stim for pelvic floor strength training.     Will continue with POC 1x a week x 7 weeks.patient has been advised to discuss vaginal estrogen with her gyn. She may also benefit from a urethral bulking agent in the future.     Decrease frequency to EOW for 2 more visits then may place on hold.     Gui [place], PT

## 2023-10-20 ENCOUNTER — CLINICAL SUPPORT (OUTPATIENT)
Dept: REHABILITATION | Facility: HOSPITAL | Age: 53
End: 2023-10-20
Attending: OBSTETRICS & GYNECOLOGY
Payer: COMMERCIAL

## 2023-10-20 DIAGNOSIS — R53.1 WEAKNESS: ICD-10-CM

## 2023-10-20 DIAGNOSIS — R32 URINARY INCONTINENCE, UNSPECIFIED TYPE: ICD-10-CM

## 2023-10-20 DIAGNOSIS — R27.8 COORDINATION ABNORMAL: Primary | ICD-10-CM

## 2023-10-20 PROCEDURE — 97112 NEUROMUSCULAR REEDUCATION: CPT | Mod: PN | Performed by: PHYSICAL THERAPIST

## 2023-10-20 NOTE — PROGRESS NOTES
Pelvic Health Physical Therapy   Treatment Note      Name: Sherry Neumann  Clinic Number: [id number]    Therapy Diagnosis:   No diagnosis found.    Physician: Praful Vazquez MD    Visit Date: 10/20/2023    Physician Orders: PT Eval and Treat PF PT  Medical Diagnosis from Referral: R32 (ICD-10-CM) - Urinary incontinence   Evaluation Date: 6/30/2023  Authorization Period Expiration: 7/30/23  Plan of Care Expiration: 11/7/23  Progress Note Due:  10/19/23  Visit # 9/12 Visits authorized: 8/20  FOTO: 1/3    Cancelled Visits: 0  No Show Visits: 0    Time In: 7:39 AM   Time Out: 8:04 AM     Total Billable Time: 23 minutes    Precautions: Standard    Subjective     Pt reports: Had about 2 leaks in the past 2 weeks.     She was compliant with home exercise program.  Response to previous treatment: none  Functional change: none    Pain in:  0/10  Pain out: 0/10  Location: none    Objective     Sherry participated in neuromuscular re-education activities to develop Coordination, Down training, and Proprioception for 23 minutes including: rehabilitative ultrasound imaging to help visualize pelvic floor muscle contraction and relaxation with kegels  sEMG with stim - pt would fatigue and have to increase pelvic floor recruitment with Kegels  Resting 0uV - Kegel to 10uV with a 3-5 sec hold.   PF e-stim at 50Hz x 10 min to faciliate PF contraction         Intervention 08/31/2023  09/19/2023  09/29/2023  10/06/2023  10/20/2023             Neuro Re-Ed PF down training Endurnance = 2 sec  Uncoordinated quick Kegels   Benefiting from overflow of bilateral adductors rehabilitative ultrasound imaging to help visualize pelvic floor muscle contraction and relaxation with kegels  Rehabilitative ultrasound with PF coordination to assist with UI. Limited pelvic floor activation today.Added lower extremity adduction with Kegels to better facilitate PF contraction. Discussed the benefits of hormones for vaginal tissue health.  Encouraged adequate  core stability to improve bladder control as well.  sEMG and PF e-stim PF e-stim 50Hz 5sec on 10 sec off x 10 min  PF e-stim 50Hz 5sec on 10 sec off x 10 min    Manual Ther Vaginal exam        TherAct             Home Exercises Provided and Patient Education Provided     Education provided:   - anatomy/physiology of pelvic floor, diaphragmatic breathing, and proper bearing down techniques  Discussed progression of plan of care with patient; educated pt in activity modification; reviewed HEP with pt. Pt demonstrated and verbalized understanding of all instruction and was provided with a handout of HEP (see Patient Instructions).    Written Home Exercises Provided: yes.  Exercises were reviewed and Sherry was able to demonstrate them prior to the end of the session.  Sherry demonstrated good  understanding of the education provided.     See EMR under Patient Instructions for exercises provided 07/27/2023 .    Assessment     PF fatigue. PF awareness has improved.     Sherry Is progressing well towards her goals.   Pt prognosis is Excellent.     Pt will continue to benefit from skilled outpatient physical therapy to address the deficits listed in the problem list box on initial evaluation, provide pt/family education and to maximize pt's level of independence in the home and community environment.     Pt's spiritual, cultural and educational needs considered and pt agreeable to plan of care and goals.     Anticipated barriers to physical therapy: none    Short Term Goals: 6 weeks   Patient will be independent with pelvic floor down training. Goal met 09/19/2023   Patient will be independent with pelvic floor relaxation to improve bowel and bladder evacuation without straining. Goal met 09/19/2023   Patient will be able to demonstrate improved pelvic floor relaxation and contraction on rehabilitative ultrasound vs. sEMG.Goal met 08/31/2023   Patient will report regular meditation, diaphragmatic breathing, pelvic floor down  training. Goal met 08/31/2023   Patient will report being dry 5/7 days a week. Goal not met - progressing   Patient will be independent with good water intake. Goal met 08/31/2023      Long Term Goals: 12 weeks   Patient will report urinating every 3-4 hours with strong urine stream. Goal not met progressing  Patient will report improved quality of life and tolerance for activities outside of her home due to improved bladder habits.Goal not met progressing  Patient will demonstrate adequate pelvic floor coordination with contraction and relaxation on sEMG vs rehabilitative ultrasound.  Goal not met progressing  Patient will be independent with proper core and pelvic floor coordination with progressive strength training.  Goal not met progressing    Plan     Continue with PF down training, proper core stability to manage intra-abdominal pressure, PF manual therapy.     Progress treatment with vaginal e-stim for pelvic floor strength training.     Will continue with POC 1x a week x 7 weeks.patient has been advised to discuss vaginal estrogen with her gyn. She may also benefit from a urethral bulking agent in the future.     Decrease frequency to EOW for 2 more visits then may place on hold.     Gui [place], PT

## 2023-11-03 ENCOUNTER — CLINICAL SUPPORT (OUTPATIENT)
Dept: REHABILITATION | Facility: HOSPITAL | Age: 53
End: 2023-11-03
Attending: OBSTETRICS & GYNECOLOGY
Payer: COMMERCIAL

## 2023-11-03 DIAGNOSIS — R27.8 COORDINATION ABNORMAL: ICD-10-CM

## 2023-11-03 DIAGNOSIS — R32 URINARY INCONTINENCE, UNSPECIFIED TYPE: Primary | ICD-10-CM

## 2023-11-03 DIAGNOSIS — R53.1 WEAKNESS: ICD-10-CM

## 2023-11-03 PROCEDURE — 97112 NEUROMUSCULAR REEDUCATION: CPT | Mod: PN | Performed by: PHYSICAL THERAPIST

## 2023-11-03 NOTE — PLAN OF CARE
Pelvic Health Physical Therapy   Treatment Note and Updated POC     Name: Sherry Neumann  Clinic Number: [id number]    Therapy Diagnosis:   Encounter Diagnoses   Name Primary?    Urinary incontinence, unspecified type Yes    Coordination abnormal     Weakness        Physician: Praful Vazquez MD    Visit Date: 11/3/2023    Physician Orders: PT Eval and Treat PF PT  Medical Diagnosis from Referral: R32 (ICD-10-CM) - Urinary incontinence   Evaluation Date: 6/30/2023  Authorization Period Expiration: 7/30/23  Plan of Care Expiration: 1/26/2024  Progress Note Due:  12/1/2023  Visit # 10/12 Visits authorized: 9/20  FOTO: 2/3    Cancelled Visits: 0  No Show Visits: 0    Time In: 7:31 AM   Time Out: 8:00 AM   Total Billable Time: 29 minutes    Precautions: Standard    Subjective     Pt reports: Had a few small leaks this week. Had some pain in her left shoulder due to sleeping wrong. Doing well over all. Reports that nocturia is also improved. She is still running and doing Yoga.   She is pleased with her progress this far.     She was compliant with home exercise program.  Response to previous treatment: none  Functional change: none    Pain in:  0/10  Pain out: 0/10  Location: none    Objective       Sherry participated in neuromuscular re-education activities to develop Coordination, Down training, and Proprioception for 29 minutes including: rehabilitative ultrasound imaging to help visualize pelvic floor muscle contraction and relaxation with kegels         Intervention 08/31/2023  09/19/2023  09/29/2023  10/06/2023  10/20/2023  11/03/2023              Neuro Re-Ed PF down training Endurnance = 2 sec  Uncoordinated quick Kegels   Benefiting from overflow of bilateral adductors rehabilitative ultrasound imaging to help visualize pelvic floor muscle contraction and relaxation with kegels  Rehabilitative ultrasound with PF coordination to assist with UI. Limited pelvic floor activation today.Added lower extremity adduction  with Kegels to better facilitate PF contraction. Discussed the benefits of hormones for vaginal tissue health.  Encouraged adequate core stability to improve bladder control as well.  sEMG and PF e-stim PF e-stim 50Hz 5sec on 10 sec off x 10 min  PF e-stim 50Hz 5sec on 10 sec off x 10 min  rehabilitative ultrasound imaging to help visualize pelvic floor muscle contraction and relaxation with kegels   Manual Ther Vaginal exam         TherAct              Home Exercises Provided and Patient Education Provided     Education provided:   - anatomy/physiology of pelvic floor, diaphragmatic breathing, and proper bearing down techniques  Discussed progression of plan of care with patient; educated pt in activity modification; reviewed HEP with pt. Pt demonstrated and verbalized understanding of all instruction and was provided with a handout of HEP (see Patient Instructions).    Written Home Exercises Provided: yes.  Exercises were reviewed and Sherry was able to demonstrate them prior to the end of the session.  Sherry demonstrated good  understanding of the education provided.     See EMR under Patient Instructions for exercises provided 07/27/2023 .    Assessment     PF activation has improved.     Sherry Is progressing well towards her goals.   Pt prognosis is Excellent.     Pt will continue to benefit from skilled outpatient physical therapy to address the deficits listed in the problem list box on initial evaluation, provide pt/family education and to maximize pt's level of independence in the home and community environment.     Pt's spiritual, cultural and educational needs considered and pt agreeable to plan of care and goals.     Anticipated barriers to physical therapy: none    Short Term Goals: 6 weeks   Patient will be independent with pelvic floor down training. Goal met 09/19/2023   Patient will be independent with pelvic floor relaxation to improve bowel and bladder evacuation without straining. Goal met 09/19/2023    Patient will be able to demonstrate improved pelvic floor relaxation and contraction on rehabilitative ultrasound vs. sEMG.Goal met 08/31/2023   Patient will report regular meditation, diaphragmatic breathing, pelvic floor down training. Goal met 08/31/2023   Patient will report being dry 5/7 days a week. Goal met - dry 5-6/7 days - 11/03/2023   Patient will be independent with good water intake. Goal met 08/31/2023      Long Term Goals: 12 weeks   Patient will report urinating every 3-4 hours with strong urine stream. Goal not met progressing  Patient will report improved quality of life and tolerance for activities outside of her home due to improved bladder habits.Goal met - 11/03/2023   Patient will demonstrate adequate pelvic floor coordination with contraction and relaxation on sEMG vs rehabilitative ultrasound.  Goal not met progressing  Patient will be independent with proper core and pelvic floor coordination with progressive strength training.  Goal not met progressing    Plan     Continue with PF down training, proper core stability to manage intra-abdominal pressure, PF manual therapy.     Progress treatment with vaginal e-stim for pelvic floor strength training.     Patient has been advised to discuss vaginal estrogen with her gyn. She may also benefit from a urethral bulking agent in the future.     Decrease frequency to 1-2 xs per month x 3 more visit.    Gui [place], PT

## 2023-11-03 NOTE — PROGRESS NOTES
Pelvic Health Physical Therapy   Treatment Note and Updated POC     Name: Sherry Neumann  Clinic Number: [id number]    Therapy Diagnosis:   Encounter Diagnoses   Name Primary?    Urinary incontinence, unspecified type Yes    Coordination abnormal     Weakness        Physician: Praful Vazquez MD    Visit Date: 11/3/2023    Physician Orders: PT Eval and Treat PF PT  Medical Diagnosis from Referral: R32 (ICD-10-CM) - Urinary incontinence   Evaluation Date: 6/30/2023  Authorization Period Expiration: 7/30/23  Plan of Care Expiration: 1/26/2024  Progress Note Due:  12/1/2023  Visit # 10/12 Visits authorized: 9/20  FOTO: 2/3    Cancelled Visits: 0  No Show Visits: 0    Time In: 7:31 AM   Time Out: 8:00 AM   Total Billable Time: 29 minutes    Precautions: Standard    Subjective     Pt reports: Had a few small leaks this week. Had some pain in her left shoulder due to sleeping wrong. Doing well over all. Reports that nocturia is also improved. She is still running and doing Yoga.   She is pleased with her progress this far.     She was compliant with home exercise program.  Response to previous treatment: none  Functional change: none    Pain in:  0/10  Pain out: 0/10  Location: none    Objective       Sherry participated in neuromuscular re-education activities to develop Coordination, Down training, and Proprioception for 29 minutes including: rehabilitative ultrasound imaging to help visualize pelvic floor muscle contraction and relaxation with kegels         Intervention 08/31/2023  09/19/2023  09/29/2023  10/06/2023  10/20/2023  11/03/2023              Neuro Re-Ed PF down training Endurnance = 2 sec  Uncoordinated quick Kegels   Benefiting from overflow of bilateral adductors rehabilitative ultrasound imaging to help visualize pelvic floor muscle contraction and relaxation with kegels  Rehabilitative ultrasound with PF coordination to assist with UI. Limited pelvic floor activation today.Added lower extremity adduction  with Kegels to better facilitate PF contraction. Discussed the benefits of hormones for vaginal tissue health.  Encouraged adequate core stability to improve bladder control as well.  sEMG and PF e-stim PF e-stim 50Hz 5sec on 10 sec off x 10 min  PF e-stim 50Hz 5sec on 10 sec off x 10 min  rehabilitative ultrasound imaging to help visualize pelvic floor muscle contraction and relaxation with kegels   Manual Ther Vaginal exam         TherAct              Home Exercises Provided and Patient Education Provided     Education provided:   - anatomy/physiology of pelvic floor, diaphragmatic breathing, and proper bearing down techniques  Discussed progression of plan of care with patient; educated pt in activity modification; reviewed HEP with pt. Pt demonstrated and verbalized understanding of all instruction and was provided with a handout of HEP (see Patient Instructions).    Written Home Exercises Provided: yes.  Exercises were reviewed and Sherry was able to demonstrate them prior to the end of the session.  Sherry demonstrated good  understanding of the education provided.     See EMR under Patient Instructions for exercises provided 07/27/2023 .    Assessment     PF activation has improved.     Sherry Is progressing well towards her goals.   Pt prognosis is Excellent.     Pt will continue to benefit from skilled outpatient physical therapy to address the deficits listed in the problem list box on initial evaluation, provide pt/family education and to maximize pt's level of independence in the home and community environment.     Pt's spiritual, cultural and educational needs considered and pt agreeable to plan of care and goals.     Anticipated barriers to physical therapy: none    Short Term Goals: 6 weeks   Patient will be independent with pelvic floor down training. Goal met 09/19/2023   Patient will be independent with pelvic floor relaxation to improve bowel and bladder evacuation without straining. Goal met 09/19/2023    Patient will be able to demonstrate improved pelvic floor relaxation and contraction on rehabilitative ultrasound vs. sEMG.Goal met 08/31/2023   Patient will report regular meditation, diaphragmatic breathing, pelvic floor down training. Goal met 08/31/2023   Patient will report being dry 5/7 days a week. Goal met - dry 5-6/7 days - 11/03/2023   Patient will be independent with good water intake. Goal met 08/31/2023      Long Term Goals: 12 weeks   Patient will report urinating every 3-4 hours with strong urine stream. Goal not met progressing  Patient will report improved quality of life and tolerance for activities outside of her home due to improved bladder habits.Goal met - 11/03/2023   Patient will demonstrate adequate pelvic floor coordination with contraction and relaxation on sEMG vs rehabilitative ultrasound.  Goal not met progressing  Patient will be independent with proper core and pelvic floor coordination with progressive strength training.  Goal not met progressing    Plan     Continue with PF down training, proper core stability to manage intra-abdominal pressure, PF manual therapy.     Progress treatment with vaginal e-stim for pelvic floor strength training.     Patient has been advised to discuss vaginal estrogen with her gyn. She may also benefit from a urethral bulking agent in the future.     Decrease frequency to 1-2 xs per month x 3 more visit.    Gui [place], PT

## 2023-12-01 ENCOUNTER — CLINICAL SUPPORT (OUTPATIENT)
Dept: REHABILITATION | Facility: HOSPITAL | Age: 53
End: 2023-12-01
Attending: OBSTETRICS & GYNECOLOGY
Payer: COMMERCIAL

## 2023-12-01 DIAGNOSIS — R53.1 WEAKNESS: ICD-10-CM

## 2023-12-01 DIAGNOSIS — R32 URINARY INCONTINENCE, UNSPECIFIED TYPE: Primary | ICD-10-CM

## 2023-12-01 DIAGNOSIS — R27.8 COORDINATION ABNORMAL: ICD-10-CM

## 2023-12-01 PROCEDURE — 97112 NEUROMUSCULAR REEDUCATION: CPT | Mod: PN | Performed by: PHYSICAL THERAPIST

## 2023-12-01 NOTE — PROGRESS NOTES
Pelvic Health Physical Therapy   Treatment Note and Discharge Summary     Name: Sherry Neumann  Clinic Number: [id number]    Therapy Diagnosis:   Encounter Diagnoses   Name Primary?    Urinary incontinence, unspecified type Yes    Coordination abnormal     Weakness          Physician: Praful Vazquez MD    Visit Date: 12/1/2023    Physician Orders: PT Eval and Treat PF PT  Medical Diagnosis from Referral: R32 (ICD-10-CM) - Urinary incontinence   Evaluation Date: 6/30/2023  Authorization Period Expiration: 7/30/23  Plan of Care Expiration: 1/26/2024  Progress Note Due:  12/1/2023  Visit # 10/12 Visits authorized: 9/20  FOTO: 2/3    Cancelled Visits: 0  No Show Visits: 0    Time In: 7:40 AM   Time Out: 8:13 AM     Total Billable Time: 33 minutes    Precautions: Standard    Subjective     Pt reports: Had a couple of leaks over her breaks. She sneeze without leaking.     She was compliant with home exercise program.  Response to previous treatment: none  Functional change: none    Pain in:  0/10  Pain out: 0/10  Location: none    Objective       Sherry participated in neuromuscular re-education activities to develop Coordination, Down training, and Proprioception for 33 minutes including: pelvic floor e-stim with sEMG.          Intervention 08/31/2023  09/19/2023  09/29/2023  10/06/2023  10/20/2023  11/03/2023  12/01/2023               Neuro Re-Ed PF down training Endurnance = 2 sec  Uncoordinated quick Kegels   Benefiting from overflow of bilateral adductors rehabilitative ultrasound imaging to help visualize pelvic floor muscle contraction and relaxation with kegels  Rehabilitative ultrasound with PF coordination to assist with UI. Limited pelvic floor activation today.Added lower extremity adduction with Kegels to better facilitate PF contraction. Discussed the benefits of hormones for vaginal tissue health.  Encouraged adequate core stability to improve bladder control as well.  sEMG and PF e-stim PF e-stim 50Hz 5sec on  10 sec off x 10 min  PF e-stim 50Hz 5sec on 10 sec off x 10 min  rehabilitative ultrasound imaging to help visualize pelvic floor muscle contraction and relaxation with kegels PF e-stim 50Hz 5sec on 10 sec off x 10 min    Manual Ther Vaginal exam          TherAct               Home Exercises Provided and Patient Education Provided     Education provided:   - anatomy/physiology of pelvic floor, diaphragmatic breathing, and proper bearing down techniques  Discussed progression of plan of care with patient; educated pt in activity modification; reviewed HEP with pt. Pt demonstrated and verbalized understanding of all instruction and was provided with a handout of HEP (see Patient Instructions).    Written Home Exercises Provided: yes.  Exercises were reviewed and Sherry was able to demonstrate them prior to the end of the session.  Sherry demonstrated good  understanding of the education provided.     See EMR under Patient Instructions for exercises provided 07/27/2023 .    Assessment     Bladder control is improved.     Sherry Is progressing well towards her goals.   Pt prognosis is Excellent.     Pt will continue to benefit from skilled outpatient physical therapy to address the deficits listed in the problem list box on initial evaluation, provide pt/family education and to maximize pt's level of independence in the home and community environment.     Pt's spiritual, cultural and educational needs considered and pt agreeable to plan of care and goals.     Anticipated barriers to physical therapy: none    Short Term Goals: 6 weeks   Patient will be independent with pelvic floor down training. Goal met 09/19/2023   Patient will be independent with pelvic floor relaxation to improve bowel and bladder evacuation without straining. Goal met 09/19/2023   Patient will be able to demonstrate improved pelvic floor relaxation and contraction on rehabilitative ultrasound vs. sEMG.Goal met 08/31/2023   Patient will report regular  meditation, diaphragmatic breathing, pelvic floor down training. Goal met 08/31/2023   Patient will report being dry 5/7 days a week. Goal met - dry 5-6/7 days - 11/03/2023   Patient will be independent with good water intake. Goal met 08/31/2023      Long Term Goals: 12 weeks   Patient will report urinating every 3-4 hours with strong urine stream.  Goal met 12/01/2023   Patient will report improved quality of life and tolerance for activities outside of her home due to improved bladder habits.Goal met - 11/03/2023   Patient will demonstrate adequate pelvic floor coordination with contraction and relaxation on sEMG vs rehabilitative ultrasound.  Goal met 12/01/2023   Patient will be independent with proper core and pelvic floor coordination with progressive strength training.  Goal met 12/01/2023     Plan     Discharge physical therapy at this time.     Gui [place], PT